# Patient Record
Sex: FEMALE
[De-identification: names, ages, dates, MRNs, and addresses within clinical notes are randomized per-mention and may not be internally consistent; named-entity substitution may affect disease eponyms.]

---

## 2021-04-09 ENCOUNTER — HOSPITAL ENCOUNTER (OUTPATIENT)
Dept: HOSPITAL 56 - MW.SDS | Age: 66
Discharge: HOME | End: 2021-04-09
Attending: SURGERY
Payer: COMMERCIAL

## 2021-04-09 DIAGNOSIS — Z88.8: ICD-10-CM

## 2021-04-09 DIAGNOSIS — Z79.899: ICD-10-CM

## 2021-04-09 DIAGNOSIS — E78.00: ICD-10-CM

## 2021-04-09 DIAGNOSIS — R19.5: ICD-10-CM

## 2021-04-09 DIAGNOSIS — Z88.5: ICD-10-CM

## 2021-04-09 DIAGNOSIS — Z98.890: ICD-10-CM

## 2021-04-09 DIAGNOSIS — R19.4: Primary | ICD-10-CM

## 2021-04-09 PROCEDURE — 45378 DIAGNOSTIC COLONOSCOPY: CPT

## 2021-04-09 NOTE — PCM.PREANE
Preanesthetic Assessment





- Anesthesia/Transfusion/Family Hx


Anesthesia History: Prior Anesthesia Without Reaction


Family History of Anesthesia Reaction: No


Transfusion History: No Prior Transfusion(s)





- Review of Systems


General: No Symptoms


Pulmonary: No Symptoms


Cardiovascular: No Symptoms


Gastrointestinal: No Symptoms


Neurological: No Symptoms


Other: Reports: None





- Physical Assessment


NPO Status Date: 04/09/21


NPO Status Time: 00:05


Vital Signs: 





                                Last Vital Signs











Temp  98.1 F   04/09/21 11:10


 


Pulse  106 H  04/09/21 11:10


 


Resp  15   04/09/21 11:10


 


BP  129/79   04/09/21 11:10


 


Pulse Ox  99   04/09/21 11:10











Height: 5 ft 5 in


Weight: 132 lb


ASA Class: 2


Mental Status: Alert & Oriented x3


Airway Class: Mallampati = 2


Dentition: Reports: Normal Dentition


ROM/Head Extension: Full


Lungs: Clear to Auscultation, Normal Respiratory Effort


Cardiovascular: Regular Rate, Regular Rhythm





- Allergies


Allergies/Adverse Reactions: 


                                    Allergies











Allergy/AdvReac Type Severity Reaction Status Date / Time


 


acetaminophen [From Percocet] Allergy  Nausea and Verified 04/05/21 10:20





   Vomiting  


 


morphine Allergy  Nausea and Verified 04/05/21 10:20





   Vomiting  


 


oxycodone [From Percocet] Allergy  Nausea and Verified 04/05/21 10:20





   Vomiting  














- Anesthesia Plan


Pre-Op Medication Ordered: None





- Acknowledgements


Anesthesia Type Planned: General Anesthesia


Pt an Appropriate Candidate for the Planned Anesthesia: Yes


Alternatives and Risks of Anesthesia Discussed w Pt/Guardian: Yes


Pt/Guardian Understands and Agrees with Anesthesia Plan: Yes


Additional Comments: 





npo


tob none


etoh rare


no cv problems


par no questions








PreAnesthesia Questionnaire





- Past Health History


Medical/Surgical History: Denies Medical/Surgical History


HEENT History: Reports: Other (See Below)


Other HEENT History: wears glasses


Cardiovascular History: Reports: High Cholesterol


OB/GYN History: Reports: Pregnancy


Musculoskeletal History: Reports: Fracture


Other Musculoskeletal History: hx of fx vertebrate


Neurological History: Reports: Other (See Below)


Other Neuro History: hx of motion sickness





- Past Surgical History


Head Surgeries/Procedures: Reports: None


GI Surgical History: Reports: Colonoscopy


Female  Surgical History: Reports: Breast Implant, Cystectomy


Musculoskeletal Surgical History: Reports: Other (See Below)


Other Musculoskeletal Surgeries/Procedures:: plastic in back from repair of fx 

vertevrate (T11-12)





- SUBSTANCE USE


Tobacco Use Status *Q: Never Tobacco User


Recreational Drug Use History: No





- HOME MEDS


Home Medications: 


                                    Home Meds





Multivitamin [Multi Vitamin Daily] 1 each PO DAILY 05/21/14 [History]


Calcium Carbonate [Calcium] 1,200 mg PO DAILY 03/08/21 [History]


Cholecalciferol (Vitamin D3) [Vitamin D3] 1,000 unit PO DAILY 03/08/21 [History]


Chondroitin/Glucosamine [Glucosamine-Chondroitin] 1 cap PO DAILY 03/08/21 

[History]


Fish Oil/Omega-3 Fatty Acids [Fish Oil 1,000 MG] 1 gm PO DAILY 03/08/21 

[History]


Psyllium Husk [Fiber] 1 tab PO DAILY 03/08/21 [History]


Rosuvastatin [Crestor] 5 mg PO DAILY 03/08/21 [History]


Zinc 100 mg PO DAILY 03/08/21 [History]











- CURRENT (IN HOUSE) MEDS


Current Meds: 





                               Current Medications





Lactated Ringer's (Ringers, Lactated)  1,000 mls @ 125 mls/hr IV ASDIRECTED [facility]


   Last Admin: 04/09/21 11:30 Dose:  125 mls/hr


   Documented by: 





Discontinued Medications





Fentanyl (Fentanyl 100 Mcg/2 Ml Sdv) Confirm Administered Dose 100 mcg .ROUTE 

.STK-MED ONE


   Stop: 04/09/21 11:23


Lactated Ringer's (Ringers, Lactated)  1,000 mls @ 125 mls/hr IV ASDIRECTED [facility]


Midazolam HCl (Midazolam 1 Mg/Ml 2 Ml Sdv) Confirm Administered Dose 2 mg .ROUTE

 .STK-MED ONE


   Stop: 04/09/21 11:23


Propofol (Propofol 200 Mg/20 Ml Sdv) Confirm Administered Dose 200 mg .ROUTE 

.STK-MED ONE


   Stop: 04/09/21 11:23

## 2021-04-09 NOTE — PCM.OPNOTE
- General Post-Op/Procedure Note


Date of Surgery/Procedure: 04/09/21


Operative Procedure(s): Colonoscopy


Pre Op Diagnosis: Change in bowel habits.  Decreased stool caliber.


Post-Op Diagnosis: No evidence of neoplasia.


Anesthesia Technique: MAC (ASA II)


Primary Surgeon: Franck Michaud


Assistant: Luz Denny


Condition: Good


Free Text/Narrative:: 


DICTATION [id number]





CPT CODE 30561

## 2021-04-09 NOTE — OR
SURGEON:

Franck Michaud M.D.

 

DATE OF PROCEDURE:  04/09/2021

 

OPERATION PERFORMED:

Colonoscopy.

 

PRIMARY SURGEON:

Franck Michaud M.D.

 

ASSISTANT:

 assist:  [employer] student.

 

ANESTHESIA:

MAC.

 

ASA CLASSIFICATION:

II.

 

PREOPERATIVE DIAGNOSIS:

Change in bowel habits with decreased caliber of stool.

 

POSTOPERATIVE DIAGNOSIS:

No evidence of neoplasia.

 

DESCRIPTION OF PROCEDURE:

The patient was taken to the endoscopy room and positioned on the endoscopy

table in the left lateral decubitus position.  Time-out was called for

appropriate identification of the patient and procedure.  Monitored anesthesia

care was provided.  The colonoscope was inserted into the rectum and advanced

with moderate difficulty to the proximal ascending colon visualizing the cecum

in the distance.  I was not able to retroflex the colonoscope in the cecum.  The

colonoscope was slowly withdrawn carefully visualizing the cecum, ascending

colon, hepatic flexure, transverse colon, splenic flexure, descending colon,

sigmoid colon, and rectum.  No tumors or polyps were encountered.  There was no

evidence of diverticular disease.  I did not see any evidence of a stricture.

No bleeding was noted in the lower gastrointestinal tract.  Once the colonoscope

was withdrawn to the rectum, it was retroflexed to visualize the anal orifice

from above.  Again, no tumors or polyps were seen and there were no acute

hemorrhoidal changes.  The colonoscope was then straightened, the rectum

aspirated, and the colonoscope removed.

 

The patient tolerated the procedure well and was taken to recovery room in

stable condition.

 

 

FUNMI / VILMA

DD:  04/09/2021 12:22:26

DT:  04/09/2021 14:15:20

Job #:  [id number]

## 2021-04-09 NOTE — [id number]
Post Anesthesia Note





- EVALUATION WITHIN 48HRS OF ANESTHETIC


Vital Signs in Normal Range: Yes


Patient Participated in Evaluation: Yes


Respiratory Function Stable: Yes


Airway Patent: Yes


Cardiovascular Function Stable: Yes


Hydration Status Stable: Yes


Pain Control Satisfactory: Yes


Nausea and Vomiting Control Satisfactory: Yes


Mental Status Recovered: Yes


Vital Signs: 


                                Last Vital Signs











Temp  35.9 C L  04/09/21 12:41


 


Pulse  72   04/09/21 12:41


 


Resp  14   04/09/21 12:41


 


BP  124/70   04/09/21 12:41


 


Pulse Ox  98   04/09/21 12:41

## 2025-06-03 ENCOUNTER — HOSPITAL ENCOUNTER (EMERGENCY)
Dept: HOSPITAL 56 - MW.ED | Age: 70
Discharge: HOME | End: 2025-06-03
Payer: MEDICARE

## 2025-06-03 DIAGNOSIS — Z88.8: ICD-10-CM

## 2025-06-03 DIAGNOSIS — Z88.5: ICD-10-CM

## 2025-06-03 DIAGNOSIS — I77.819: ICD-10-CM

## 2025-06-03 DIAGNOSIS — R07.89: Primary | ICD-10-CM

## 2025-06-03 DIAGNOSIS — Z79.899: ICD-10-CM

## 2025-06-03 LAB
ALBUMIN SERPL-MCNC: 3.9 G/DL (ref 3.4–5)
ALBUMIN/GLOB SERPL: 1.4 {RATIO} (ref 0.9–1.6)
ALP SERPL-CCNC: 79 U/L (ref 46–116)
ALT SERPL-CCNC: 26 IU/L (ref 14–63)
APTT PPP: 27.6 SEC (ref 23.9–30.7)
AST SERPL-CCNC: 16 IU/L (ref 15–37)
BASOPHILS # BLD AUTO: 0.05 K/UL (ref 0–0.2)
BASOPHILS NFR BLD AUTO: 0.8 % (ref 0–1)
BILIRUB SERPL-MCNC: 0.3 MG/DL (ref 0.2–1)
BUN SERPL-MCNC: 17 MG/DL (ref 7–18)
CALCIUM SERPL-MCNC: 9 MG/DL (ref 8.5–10.1)
CHLORIDE SERPL-SCNC: 103 MMOL/L (ref 98–107)
CO2 SERPL-SCNC: 29.8 MMOL/L (ref 21–32)
CREAT CL 24H UR+SERPL-VRATE: (no result) ML/MIN
CREAT SERPL-MCNC: 0.7 MG/DL (ref 0.6–1)
EGFRCR SERPLBLD CKD-EPI 2021: 94 ML/MIN (ref 60–?)
EOSINOPHIL # BLD AUTO: 0.11 K/UL (ref 0–0.45)
EOSINOPHIL NFR BLD AUTO: 1.7 % (ref 0–6)
GLOBULIN SER-MCNC: 2.8 G/DL (ref 2.6–4)
GLUCOSE SERPL-MCNC: 100 MG/DL (ref 74–106)
HCT VFR BLD AUTO: 40.2 % (ref 37–47)
HGB BLD-MCNC: 13.2 G/DL (ref 12–16)
IMM GRANULOCYTES # BLD: 0.01 K/UL (ref 0–0.05)
IMM GRANULOCYTES NFR BLD: 0.2 % (ref 0–0.4)
INR PPP: 0.93 (ref 0.86–1.11)
LYMPHOCYTES # BLD AUTO: 2.77 K/UL (ref 1–4.8)
LYMPHOCYTES NFR BLD AUTO: 42.1 % (ref 24–44)
MAGNESIUM SERPL-MCNC: 2.2 MG/DL (ref 1.8–2.4)
MCH RBC QN AUTO: 32.4 PG (ref 28–32)
MCHC RBC AUTO-ENTMCNC: 32.8 G/DL (ref 32–36)
MCHC RBC AUTO-ENTMCNC: 98.5 FL (ref 83–99)
MONOCYTES # BLD AUTO: 0.4 K/UL (ref 0–0.8)
MONOCYTES NFR BLD AUTO: 6.1 % (ref 0–8)
NEUTROPHILS # BLD AUTO: 3.24 K/UL (ref 1.8–7.7)
NEUTROPHILS NFR BLD AUTO: 49.1 % (ref 41–71)
NRBC BLD AUTO-RTO: 0 /100WBC (ref 0–0.2)
NRBC BLD AUTO-RTO: 0 K/UL (ref 0–0.02)
PLATELET # BLD AUTO: 155 K/UL (ref 150–400)
PMV BLD AUTO: 11.9 FL (ref 9.4–12.3)
POTASSIUM SERPL-SCNC: 3.9 MMOL/L (ref 3.5–5.1)
PROT SERPL-MCNC: 6.7 G/DL (ref 6.4–8.2)
RBC # BLD AUTO: 4.08 M/UL (ref 4.1–5.3)
SODIUM SERPL-SCNC: 138 MMOL/L (ref 136–145)
WBC # BLD AUTO: 6.58 K/UL (ref 3.9–11.3)

## 2025-06-03 PROCEDURE — 80053 COMPREHEN METABOLIC PANEL: CPT

## 2025-06-03 PROCEDURE — 85025 COMPLETE CBC W/AUTO DIFF WBC: CPT

## 2025-06-03 PROCEDURE — 71250 CT THORAX DX C-: CPT

## 2025-06-03 PROCEDURE — 83735 ASSAY OF MAGNESIUM: CPT

## 2025-06-03 PROCEDURE — 84484 ASSAY OF TROPONIN QUANT: CPT

## 2025-06-03 PROCEDURE — 85610 PROTHROMBIN TIME: CPT

## 2025-06-03 PROCEDURE — 85730 THROMBOPLASTIN TIME PARTIAL: CPT

## 2025-06-03 PROCEDURE — 99285 EMERGENCY DEPT VISIT HI MDM: CPT

## 2025-06-03 PROCEDURE — 36415 COLL VENOUS BLD VENIPUNCTURE: CPT

## 2025-06-03 PROCEDURE — 93005 ELECTROCARDIOGRAM TRACING: CPT

## 2025-06-03 RX ADMIN — KETOROLAC TROMETHAMINE ONE MG: 10 TABLET, FILM COATED ORAL at 19:22

## 2025-06-03 RX ADMIN — CYCLOBENZAPRINE HYDROCHLORIDE ONE MG: 10 TABLET, FILM COATED ORAL at 19:22
